# Patient Record
Sex: FEMALE | Race: WHITE
[De-identification: names, ages, dates, MRNs, and addresses within clinical notes are randomized per-mention and may not be internally consistent; named-entity substitution may affect disease eponyms.]

---

## 2018-04-24 ENCOUNTER — HOSPITAL ENCOUNTER (OUTPATIENT)
Dept: HOSPITAL 47 - RADUSWWP | Age: 83
Discharge: HOME | End: 2018-04-24
Payer: MEDICARE

## 2018-04-24 DIAGNOSIS — M79.661: ICD-10-CM

## 2018-04-24 DIAGNOSIS — M79.662: Primary | ICD-10-CM

## 2018-04-24 PROCEDURE — 93923 UPR/LXTR ART STDY 3+ LVLS: CPT

## 2019-10-15 ENCOUNTER — HOSPITAL ENCOUNTER (OUTPATIENT)
Dept: HOSPITAL 47 - LABWHC1 | Age: 84
Discharge: HOME | End: 2019-10-15
Attending: INTERNAL MEDICINE
Payer: MEDICARE

## 2019-10-15 DIAGNOSIS — E66.9: Primary | ICD-10-CM

## 2019-10-15 DIAGNOSIS — I12.9: ICD-10-CM

## 2019-10-15 DIAGNOSIS — N18.9: ICD-10-CM

## 2019-10-15 PROCEDURE — 36415 COLL VENOUS BLD VENIPUNCTURE: CPT

## 2019-10-15 PROCEDURE — 80051 ELECTROLYTE PANEL: CPT

## 2019-10-15 PROCEDURE — 82565 ASSAY OF CREATININE: CPT

## 2019-10-15 PROCEDURE — 84520 ASSAY OF UREA NITROGEN: CPT

## 2019-10-15 PROCEDURE — 83036 HEMOGLOBIN GLYCOSYLATED A1C: CPT

## 2019-10-16 LAB
ANION GAP SERPL CALC-SCNC: 13.2 MMOL/L (ref 4–12)
BUN SERPL-SCNC: 25 MG/DL (ref 9–27)
CHLORIDE SERPL-SCNC: 104 MMOL/L (ref 96–109)
CO2 SERPL-SCNC: 22.8 MMOL/L (ref 21.6–31.8)
HBA1C MFR BLD: 5.5 % (ref 4–6)
POTASSIUM SERPL-SCNC: 4.8 MMOL/L (ref 3.5–5.5)
SODIUM SERPL-SCNC: 140 MMOL/L (ref 135–145)

## 2020-04-28 ENCOUNTER — HOSPITAL ENCOUNTER (EMERGENCY)
Dept: HOSPITAL 47 - EC | Age: 85
Discharge: OTHER INSTITUTION | End: 2020-04-28
Payer: MEDICARE

## 2020-04-28 VITALS — RESPIRATION RATE: 16 BRPM | TEMPERATURE: 98.7 F

## 2020-04-28 VITALS — HEART RATE: 86 BPM | SYSTOLIC BLOOD PRESSURE: 132 MMHG | DIASTOLIC BLOOD PRESSURE: 79 MMHG

## 2020-04-28 DIAGNOSIS — R90.0: Primary | ICD-10-CM

## 2020-04-28 DIAGNOSIS — R53.1: ICD-10-CM

## 2020-04-28 DIAGNOSIS — R60.0: ICD-10-CM

## 2020-04-28 LAB
ANION GAP SERPL CALC-SCNC: 9 MMOL/L
BASOPHILS # BLD AUTO: 0 K/UL (ref 0–0.2)
BASOPHILS NFR BLD AUTO: 1 %
BUN SERPL-SCNC: 14 MG/DL (ref 7–17)
CALCIUM SPEC-MCNC: 9.8 MG/DL (ref 8.4–10.2)
CHLORIDE SERPL-SCNC: 97 MMOL/L (ref 98–107)
CO2 SERPL-SCNC: 27 MMOL/L (ref 22–30)
EOSINOPHIL # BLD AUTO: 0.1 K/UL (ref 0–0.7)
EOSINOPHIL NFR BLD AUTO: 1 %
ERYTHROCYTE [DISTWIDTH] IN BLOOD BY AUTOMATED COUNT: 5.92 M/UL (ref 3.8–5.4)
ERYTHROCYTE [DISTWIDTH] IN BLOOD: 13.4 % (ref 11.5–15.5)
GLUCOSE SERPL-MCNC: 102 MG/DL (ref 74–99)
HCT VFR BLD AUTO: 53.5 % (ref 34–46)
HGB BLD-MCNC: 17.1 GM/DL (ref 11.4–16)
LYMPHOCYTES # SPEC AUTO: 1 K/UL (ref 1–4.8)
LYMPHOCYTES NFR SPEC AUTO: 11 %
MAGNESIUM SPEC-SCNC: 2.1 MG/DL (ref 1.6–2.3)
MCH RBC QN AUTO: 28.8 PG (ref 25–35)
MCHC RBC AUTO-ENTMCNC: 31.9 G/DL (ref 31–37)
MCV RBC AUTO: 90.4 FL (ref 80–100)
MONOCYTES # BLD AUTO: 0.9 K/UL (ref 0–1)
MONOCYTES NFR BLD AUTO: 10 %
NEUTROPHILS # BLD AUTO: 6.9 K/UL (ref 1.3–7.7)
NEUTROPHILS NFR BLD AUTO: 76 %
PH UR: 5.5 [PH] (ref 5–8)
PLATELET # BLD AUTO: 222 K/UL (ref 150–450)
POTASSIUM SERPL-SCNC: 4.5 MMOL/L (ref 3.5–5.1)
SODIUM SERPL-SCNC: 133 MMOL/L (ref 137–145)
SP GR UR: 1.02 (ref 1–1.03)
UROBILINOGEN UR QL STRIP: 2 MG/DL (ref ?–2)
WBC # BLD AUTO: 9.1 K/UL (ref 3.8–10.6)

## 2020-04-28 PROCEDURE — 83735 ASSAY OF MAGNESIUM: CPT

## 2020-04-28 PROCEDURE — 96374 THER/PROPH/DIAG INJ IV PUSH: CPT

## 2020-04-28 PROCEDURE — 85025 COMPLETE CBC W/AUTO DIFF WBC: CPT

## 2020-04-28 PROCEDURE — 99285 EMERGENCY DEPT VISIT HI MDM: CPT

## 2020-04-28 PROCEDURE — 81003 URINALYSIS AUTO W/O SCOPE: CPT

## 2020-04-28 PROCEDURE — 36415 COLL VENOUS BLD VENIPUNCTURE: CPT

## 2020-04-28 PROCEDURE — 80048 BASIC METABOLIC PNL TOTAL CA: CPT

## 2020-04-28 PROCEDURE — 93005 ELECTROCARDIOGRAM TRACING: CPT

## 2020-04-28 PROCEDURE — 70450 CT HEAD/BRAIN W/O DYE: CPT

## 2020-04-28 NOTE — CT
EXAMINATION TYPE: CT brain wo con

 

DATE OF EXAM: 4/28/2020

 

COMPARISON: None

 

INDICATION: ams

 

DLP: 1158.4 mGycm, Automated exposure control for dose reduction was used.

 

CONTRAST: None

 

CT of the brain is performed utilizing 3 mm thick sections through the posterior fossa and 3 mm thick
 sections through the remaining calvarium.  Study is performed within 24 hours of arrival to the hosp
ital. 

 

No abnormal hyperdensity is present to suggest an acute intracranial hemorrhage.

There is a approximately 3.8 cm masslike area within the right temporal lobe. Significant vasogenic e
galdino is present extending into the trilobar region. There is midline shift of 0.78 cm. There is compr
ession of the lateral ventricles. Third ventricle is patent and shifted to the left. Fourth ventricle
 is midline and patent. Quadrigeminal plate and ambient cisterns appear normal

No acute infarcts are evident. No hydrocephalus is present.

 

Paranasal sinuses and mastoid air cells within the field-of-view are clear.

 

IMPRESSIONS:

1.   Masslike area estimated to measure 3.8 cm in the proximal right temporal lobe with significant v
asogenic edema extending into the trilobar region and basal ganglion contributing to midline shift of
 0.8 cm towards the left. Report was called to emergency room physician by Dr. Kirk by telephone 1
111 hours 4/28/2020.

## 2020-04-28 NOTE — ED
General Adult HPI





- General


Chief complaint: Recheck/Abnormal Lab/Rx


Stated complaint: Stroke Symptoms, Edema in feet


Time Seen by Provider: 04/28/20 15:32


Source: EMS


Mode of arrival: EMS


Limitations: physical limitation





- History of Present Illness


Initial comments: 


Dictation was produced using dragon dictation software. please excuse any 

grammatical, word or spelling errors. 





This patient was cared for during a federal and state declared state of 

emergency secondary to Covid 19





Chief Complaint: 86-year-old female past medical history of hypertension, reflux

presents with slowed speech and lethargy





History of Present Illness: 86-year-old female she has no significant 

comorbidities.  She is brought in by EMS.  EMS was called because HER-2 sons 

were concerned she is having a stroke.  She woke this morning and appeared to be

more lethargic than usual.  Patient does not have a history of lethargy.  

Patient requires significant assistance with performance of daily living.  They 

noted that her slowed speech was apparent upon waking this morning.  She was 

last normal yesterday.  She does not have any history of strokes.  Denies any 

numbness saline paresthesias.  Sons note that patient is not confused or 

slurring her speech.








The ROS documented in this emergency department record has been reviewed and 

confirmed by me.  Those systems with pertinent positive or negative responses 

have been documented in the HPI.  All other systems are other negative and/or 

noncontributory.








PHYSICAL EXAM:


General Impression: Alert and oriented x4/4, not in acute distress


HEENT: Normocephalic atraumatic, extra-ocular movements intact, pupils equal and

reactive to light bilaterally, mucous membranes moist.


Cardiovascular: Heart regular rate and rhythm


Chest: Able to complete full sentences, no retractions, no tachypnea


Abdomen: abdomen soft, non-tender, non-distended, no organomegaly


Musculoskeletal: Pulses present and equal in all extremities, no peripheral 

edema


Motor:  no focal deficits noted


Neurological: CN II-XII grossly intact, no focal motor or sensory deficits 

noted, NIH 0


Skin: Intact with no visualized rashes


Psych: Normal affect and mood





ED course: 86-year-old female presents with generalized weakness upon waking 

this morning.  Signs upon arrival are within acceptable limits.  Patient has no 

signs or symptoms to suggest CVA.  NIH is 0.


Laboratory evaluation obtained.  Hemoglobin was 17.1 concerning for hem

oconcentration.  Metabolic panel is unremarkable.  Computed tomography scan of 

the brain was obtained showing masslike area measuring 3.8 cm and there are 

proximal right temporal lobe with significant vasogenic edema.  There is also 

0.8 cm midline shift towards the left.  Patient reevaluated at bedside.  She 

appears to be in stable medical condition.  She denies any headache.  Patient 

given 10 mg of IV Decadron.  CT imaging results were discussed with patient and 

family member.  We'll plan to have patient transferred to [facility].  

Patient and family are agreeable.  Discussed patient case with Dr. Buenrostro at 

[facility].  He reports he will contact neurosurgery noted to confirm that 

they will accept patient for ER to ER transfer.  Discussed patient case with Dr. Buenrostro who is willing to accept the ER transfer.





EKG interpretation: Ventricular rate 67, sinus rhythm, PA interval 166, QRS 90, 

. No PA prolongation, no QTC prolongation, no ST or T-wave changes noted.

 No old EKG for comparison.  Overall, this EKG is unremarkable











Review of Systems


ROS Statement: 


Those systems with pertinent positive or pertinent negative responses have been 

documented in the HPI.





ROS Other: All systems not noted in ROS Statement are negative.





Past Medical History


Past Medical History: No Reported History


History of Any Multi-Drug Resistant Organisms: None Reported


Past Surgical History: No Surgical Hx Reported


Past Psychological History: No Psychological Hx Reported


Smoking Status: Never smoker


Past Alcohol Use History: None Reported


Past Drug Use History: None Reported





General Exam


Limitations: physical limitation





Course


                                   Vital Signs











  04/28/20 04/28/20





  15:33 16:56


 


Temperature 98.7 F 


 


Pulse Rate 65 81


 


Respiratory 16 16





Rate  


 


Blood Pressure 137/98 132/76


 


O2 Sat by Pulse 96 98





Oximetry  














Medical Decision Making





- Lab Data


Result diagrams: 


                                 04/28/20 16:00





                                 04/28/20 16:00


                                   Lab Results











  04/28/20 04/28/20 Range/Units





  16:00 16:00 


 


WBC  9.1   (3.8-10.6)  k/uL


 


RBC  5.92 H   (3.80-5.40)  m/uL


 


Hgb  17.1 H   (11.4-16.0)  gm/dL


 


Hct  53.5 H   (34.0-46.0)  %


 


MCV  90.4   (80.0-100.0)  fL


 


MCH  28.8   (25.0-35.0)  pg


 


MCHC  31.9   (31.0-37.0)  g/dL


 


RDW  13.4   (11.5-15.5)  %


 


Plt Count  222   (150-450)  k/uL


 


Neutrophils %  76   %


 


Lymphocytes %  11   %


 


Monocytes %  10   %


 


Eosinophils %  1   %


 


Basophils %  1   %


 


Neutrophils #  6.9   (1.3-7.7)  k/uL


 


Lymphocytes #  1.0   (1.0-4.8)  k/uL


 


Monocytes #  0.9   (0-1.0)  k/uL


 


Eosinophils #  0.1   (0-0.7)  k/uL


 


Basophils #  0.0   (0-0.2)  k/uL


 


Sodium   133 L  (137-145)  mmol/L


 


Potassium   4.5  (3.5-5.1)  mmol/L


 


Chloride   97 L  ()  mmol/L


 


Carbon Dioxide   27  (22-30)  mmol/L


 


Anion Gap   9  mmol/L


 


BUN   14  (7-17)  mg/dL


 


Creatinine   0.92  (0.52-1.04)  mg/dL


 


Est GFR (CKD-EPI)AfAm   65  (>60 ml/min/1.73 sqM)  


 


Est GFR (CKD-EPI)NonAf   57  (>60 ml/min/1.73 sqM)  


 


Glucose   102 H  (74-99)  mg/dL


 


Calcium   9.8  (8.4-10.2)  mg/dL


 


Magnesium   2.1  (1.6-2.3)  mg/dL














Disposition


Clinical Impression: 


 Intracranial mass





Disposition: OTHER INSTITUTION NOT DEFINED


Condition: Critical


Referrals: 


Mindy Tello MD [Primary Care Provider] - 1-2 days


Time of Disposition: 17:34





- Out of Hospital Transfer - Req. Specs


Out of Hospital Transfer - Requested Specifics: Other Emergency Center ([facility])

## 2020-05-15 ENCOUNTER — HOSPITAL ENCOUNTER (EMERGENCY)
Dept: HOSPITAL 47 - EC | Age: 85
LOS: 1 days | Discharge: OTHER INSTITUTION | End: 2020-05-16
Payer: MEDICARE

## 2020-05-15 DIAGNOSIS — J18.9: ICD-10-CM

## 2020-05-15 DIAGNOSIS — Z85.841: ICD-10-CM

## 2020-05-15 DIAGNOSIS — A41.9: ICD-10-CM
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
## 2020-05-15 DIAGNOSIS — T81.44XA: ICD-10-CM

## 2020-05-15 DIAGNOSIS — D72.829: ICD-10-CM

## 2020-05-15 DIAGNOSIS — K52.9: Primary | ICD-10-CM

## 2020-05-15 PROCEDURE — 86901 BLOOD TYPING SEROLOGIC RH(D): CPT

## 2020-05-15 PROCEDURE — 36556 INSERT NON-TUNNEL CV CATH: CPT

## 2020-05-15 PROCEDURE — 99291 CRITICAL CARE FIRST HOUR: CPT

## 2020-05-15 PROCEDURE — 87045 FECES CULTURE AEROBIC BACT: CPT

## 2020-05-15 PROCEDURE — 84484 ASSAY OF TROPONIN QUANT: CPT

## 2020-05-15 PROCEDURE — 85610 PROTHROMBIN TIME: CPT

## 2020-05-15 PROCEDURE — 86900 BLOOD TYPING SEROLOGIC ABO: CPT

## 2020-05-15 PROCEDURE — 96368 THER/DIAG CONCURRENT INF: CPT

## 2020-05-15 PROCEDURE — 80053 COMPREHEN METABOLIC PANEL: CPT

## 2020-05-15 PROCEDURE — 84100 ASSAY OF PHOSPHORUS: CPT

## 2020-05-15 PROCEDURE — 87046 STOOL CULTR AEROBIC BACT EA: CPT

## 2020-05-15 PROCEDURE — 93005 ELECTROCARDIOGRAM TRACING: CPT

## 2020-05-15 PROCEDURE — 36415 COLL VENOUS BLD VENIPUNCTURE: CPT

## 2020-05-15 PROCEDURE — 70450 CT HEAD/BRAIN W/O DYE: CPT

## 2020-05-15 PROCEDURE — 96372 THER/PROPH/DIAG INJ SC/IM: CPT

## 2020-05-15 PROCEDURE — 86850 RBC ANTIBODY SCREEN: CPT

## 2020-05-15 PROCEDURE — 85730 THROMBOPLASTIN TIME PARTIAL: CPT

## 2020-05-15 PROCEDURE — 74022 RADEX COMPL AQT ABD SERIES: CPT

## 2020-05-15 PROCEDURE — 96365 THER/PROPH/DIAG IV INF INIT: CPT

## 2020-05-15 PROCEDURE — 85025 COMPLETE CBC W/AUTO DIFF WBC: CPT

## 2020-05-15 PROCEDURE — 87324 CLOSTRIDIUM AG IA: CPT

## 2020-05-15 PROCEDURE — 87040 BLOOD CULTURE FOR BACTERIA: CPT

## 2020-05-15 PROCEDURE — 83735 ASSAY OF MAGNESIUM: CPT

## 2020-05-15 PROCEDURE — 96375 TX/PRO/DX INJ NEW DRUG ADDON: CPT

## 2020-05-15 PROCEDURE — 83605 ASSAY OF LACTIC ACID: CPT

## 2020-05-16 VITALS
SYSTOLIC BLOOD PRESSURE: 108 MMHG | TEMPERATURE: 97.7 F | DIASTOLIC BLOOD PRESSURE: 52 MMHG | RESPIRATION RATE: 15 BRPM | HEART RATE: 72 BPM

## 2020-05-16 LAB
ALBUMIN SERPL-MCNC: 2.9 G/DL (ref 3.5–5)
ALP SERPL-CCNC: 94 U/L (ref 38–126)
ALT SERPL-CCNC: 15 U/L (ref 4–34)
ANION GAP SERPL CALC-SCNC: 12 MMOL/L
APTT BLD: 27.7 SEC (ref 22–30)
AST SERPL-CCNC: 22 U/L (ref 14–36)
BASOPHILS # BLD AUTO: 0 K/UL (ref 0–0.2)
BASOPHILS NFR BLD AUTO: 0 %
BUN SERPL-SCNC: 71 MG/DL (ref 7–17)
CALCIUM SPEC-MCNC: 9.8 MG/DL (ref 8.4–10.2)
CHLORIDE SERPL-SCNC: 96 MMOL/L (ref 98–107)
CO2 SERPL-SCNC: 23 MMOL/L (ref 22–30)
EOSINOPHIL # BLD AUTO: 0.1 K/UL (ref 0–0.7)
EOSINOPHIL NFR BLD AUTO: 0 %
ERYTHROCYTE [DISTWIDTH] IN BLOOD BY AUTOMATED COUNT: 5.52 M/UL (ref 3.8–5.4)
ERYTHROCYTE [DISTWIDTH] IN BLOOD: 13.7 % (ref 11.5–15.5)
GLUCOSE SERPL-MCNC: 184 MG/DL (ref 74–99)
HCT VFR BLD AUTO: 49.7 % (ref 34–46)
HGB BLD-MCNC: 16.3 GM/DL (ref 11.4–16)
INR PPP: 1 (ref ?–1.2)
LYMPHOCYTES # SPEC AUTO: 0.2 K/UL (ref 1–4.8)
LYMPHOCYTES NFR SPEC AUTO: 1 %
MAGNESIUM SPEC-SCNC: 2.6 MG/DL (ref 1.6–2.3)
MCH RBC QN AUTO: 29.5 PG (ref 25–35)
MCHC RBC AUTO-ENTMCNC: 32.7 G/DL (ref 31–37)
MCV RBC AUTO: 90.1 FL (ref 80–100)
MONOCYTES # BLD AUTO: 1.1 K/UL (ref 0–1)
MONOCYTES NFR BLD AUTO: 3 %
NEUTROPHILS # BLD AUTO: 34.6 K/UL (ref 1.3–7.7)
NEUTROPHILS NFR BLD AUTO: 96 %
PLATELET # BLD AUTO: 182 K/UL (ref 150–450)
POTASSIUM SERPL-SCNC: 4.4 MMOL/L (ref 3.5–5.1)
PROT SERPL-MCNC: 5.3 G/DL (ref 6.3–8.2)
PT BLD: 10.4 SEC (ref 9–12)
SODIUM SERPL-SCNC: 131 MMOL/L (ref 137–145)
WBC # BLD AUTO: 36 K/UL (ref 3.8–10.6)

## 2020-05-16 RX ADMIN — NICARDIPINE HYDROCHLORIDE SCH MLS/HR: 2.5 INJECTION INTRAVENOUS at 02:30

## 2020-05-16 NOTE — ED
GI Bleed HPI





- General


Chief complaint: GI Bleed


Stated complaint: GI Bleed


Time Seen by Provider: 05/15/20 23:34


Source: patient, EMS, RN notes reviewed, old records reviewed


Mode of arrival: EMS


Limitations: altered mental status, physical limitation





- History of Present Illness


Initial comments: 





This is an 86-year-old female who is a poor historian history obtained by EMS 

patient is a complex recent medical history patient was postop intracranial s

urgery for tumor.  Patient was at [facility] yesterday surgery was Tuesday.  

Patient's been having diarrhea nausea vomiting and feeling weak.  No reported 

fevers patient here in the ER just complains of some generalized abdominal pain 

with mild confusion


MD complaint: other (Patient has overall confusion)


-: days(s)


Radiation: none


Severity scale (1-10): 4 (Weakness)


Quality: painless


Consistency: constant


Improves with: none


Worsens with: vomiting


Associated Symptoms: nausea, loss of appetite, shortness of breath, weakness





- Related Data


                                    Allergies











Allergy/AdvReac Type Severity Reaction Status Date / Time


 


No Known Allergies Allergy   Verified 05/15/20 23:38














Review of Systems


ROS Statement: 


Those systems with pertinent positive or pertinent negative responses have been 

documented in the HPI.





ROS Other: All systems not noted in ROS Statement are negative.





Past Medical History


Past Medical History: No Reported History, COPD, GERD/Reflux


Additional Past Medical History / Comment(s): brain tumor


History of Any Multi-Drug Resistant Organisms: None Reported


Past Surgical History: No Surgical Hx Reported


Additional Past Surgical History / Comment(s): crainotomy


Past Psychological History: No Psychological Hx Reported


Smoking Status: Never smoker


Past Alcohol Use History: None Reported


Past Drug Use History: None Reported





General Exam


Limitations: altered mental status, physical limitation


General appearance: alert, lethargic, obese


Head exam: Present: normocephalic, normal inspection.  Absent: atraumatic (well 

healing insicion CDI)


Eye exam: Present: normal appearance, PERRL, EOMI.  Absent: scleral icterus, 

conjunctival injection, periorbital swelling


ENT exam: Present: normal exam, mucous membranes dry


Neck exam: Present: normal inspection.  Absent: tenderness, meningismus, 

lymphadenopathy


Respiratory exam: Present: decreased breath sounds, prolonged expiratory.  

Absent: respiratory distress, wheezes, rales, rhonchi, stridor


Cardiovascular Exam: Present: regular rate, normal rhythm, irregular rhythm, 

normal heart sounds.  Absent: systolic murmur, diastolic murmur, rubs, gallop, 

clicks


GI/Abdominal exam: Present: soft, normal bowel sounds.  Absent: distended, 

tenderness, guarding, rebound, rigid


Extremities exam: Present: normal inspection, full ROM, normal capillary refill.

 Absent: tenderness, pedal edema, joint swelling, calf tenderness


Back exam: Present: normal inspection


Neurological exam: Present: alert, oriented X3, CN II-XII intact


Psychiatric exam: Present: normal affect, normal mood


Skin exam: Present: warm, dry, intact, normal color.  Absent: rash





Course


                                   Vital Signs











  05/15/20 05/15/20 05/16/20





  23:33 23:50 00:05


 


Temperature 97.4 F L  97.7 F


 


Pulse Rate 62 72 68


 


Respiratory 16 16 15





Rate   


 


Blood Pressure 113/60 129/77 121/57


 


O2 Sat by Pulse 95 96 97





Oximetry   














  05/16/20 05/16/20 05/16/20





  00:35 01:00 01:30


 


Temperature   


 


Pulse Rate 68 73 63


 


Respiratory 14 16 14





Rate   


 


Blood Pressure 115/62 141/49 131/89


 


O2 Sat by Pulse 91 L 96 95





Oximetry   














  05/16/20





  02:00


 


Temperature 97.8 F


 


Pulse Rate 84


 


Respiratory 15





Rate 


 


Blood Pressure 126/86


 


O2 Sat by Pulse 96





Oximetry 














- Reevaluation(s)


Reevaluation #1: 





05/16/20 02:39


Medical records reviewed including lab tests from yesterday


Reevaluation #2: 





05/16/20 02:40


Patient no significant rust for a stress vital signs remaining normal and stable

in the ER





- Consultations


Consultation #1: 





Spoke with Dr. Quiros will prefer transfer to operating hospital


Consultation #2: 





Spoke with [facility] Fry were acceptable for transfer





Procedures





- Central Line Placement


  ** Right IJ


Consent Obtained: verbal consent


Patient Placed on Monitor/Pulse Ox: Yes


MD Prep: mask, gown, gloves


Central Line Prep: Povidone-Iodine 1%


Local Anesthesia Used: Lidocaine 1%


Ultrasound Used for Placement: Yes


Central Line Lumen Inserted: triple


Bloods Obtained for Lab: Yes


Central Line Position: good blood return, all ports aspirated, flushed, capped, 

sutured in place with 3-0 nylon


Dressing Applied: Tegaderm


Post Procedure X-Ray: tip of catheter in good position


Patient Tolerated Procedure: well


Complications: none





Medical Decision Making





- Medical Decision Making





86 female DF for evaluation patient presents with weakness patient is 5 days 

postoperative cranial tumor.  Craniotomy.  Patient has pneumonia with 

significantly elevated white count.  Patient will be admitted for postop 

infection pneumonia and sepsis





- Lab Data


Result diagrams: 


                                 05/16/20 00:55





                                 05/16/20 00:55


                                   Lab Results











  05/16/20 05/16/20 05/16/20 Range/Units





  00:55 00:55 00:55 


 


WBC  36.0 H    (3.8-10.6)  k/uL


 


RBC  5.52 H    (3.80-5.40)  m/uL


 


Hgb  16.3 H    (11.4-16.0)  gm/dL


 


Hct  49.7 H    (34.0-46.0)  %


 


MCV  90.1    (80.0-100.0)  fL


 


MCH  29.5    (25.0-35.0)  pg


 


MCHC  32.7    (31.0-37.0)  g/dL


 


RDW  13.7    (11.5-15.5)  %


 


Plt Count  182    (150-450)  k/uL


 


Neutrophils %  96    %


 


Lymphocytes %  1    %


 


Monocytes %  3    %


 


Eosinophils %  0    %


 


Basophils %  0    %


 


Neutrophils #  34.6 H    (1.3-7.7)  k/uL


 


Lymphocytes #  0.2 L    (1.0-4.8)  k/uL


 


Monocytes #  1.1 H    (0-1.0)  k/uL


 


Eosinophils #  0.1    (0-0.7)  k/uL


 


Basophils #  0.0    (0-0.2)  k/uL


 


PT   10.4   (9.0-12.0)  sec


 


INR   1.0   (<1.2)  


 


APTT   27.7   (22.0-30.0)  sec


 


Sodium    131 L  (137-145)  mmol/L


 


Potassium    4.4  (3.5-5.1)  mmol/L


 


Chloride    96 L  ()  mmol/L


 


Carbon Dioxide    23  (22-30)  mmol/L


 


Anion Gap    12  mmol/L


 


BUN    71 H  (7-17)  mg/dL


 


Creatinine    1.55 H  (0.52-1.04)  mg/dL


 


Est GFR (CKD-EPI)AfAm    35  (>60 ml/min/1.73 sqM)  


 


Est GFR (CKD-EPI)NonAf    30  (>60 ml/min/1.73 sqM)  


 


Glucose    184 H  (74-99)  mg/dL


 


Plasma Lactic Acid Kota     (0.7-2.0)  mmol/L


 


Calcium    9.8  (8.4-10.2)  mg/dL


 


Phosphorus    4.7 H  (2.5-4.5)  mg/dL


 


Magnesium    2.6 H  (1.6-2.3)  mg/dL


 


Total Bilirubin    1.1  (0.2-1.3)  mg/dL


 


AST    22  (14-36)  U/L


 


ALT    15  (4-34)  U/L


 


Alkaline Phosphatase    94  ()  U/L


 


Troponin I     (0.000-0.034)  ng/mL


 


Total Protein    5.3 L  (6.3-8.2)  g/dL


 


Albumin    2.9 L  (3.5-5.0)  g/dL


 


Blood Type     


 


Blood Type Recheck     


 


Bld Type Recheck Status     


 


Antibody Screen     


 


Spec Expiration Date     














  05/16/20 05/16/20 05/16/20 Range/Units





  00:55 00:55 00:55 


 


WBC     (3.8-10.6)  k/uL


 


RBC     (3.80-5.40)  m/uL


 


Hgb     (11.4-16.0)  gm/dL


 


Hct     (34.0-46.0)  %


 


MCV     (80.0-100.0)  fL


 


MCH     (25.0-35.0)  pg


 


MCHC     (31.0-37.0)  g/dL


 


RDW     (11.5-15.5)  %


 


Plt Count     (150-450)  k/uL


 


Neutrophils %     %


 


Lymphocytes %     %


 


Monocytes %     %


 


Eosinophils %     %


 


Basophils %     %


 


Neutrophils #     (1.3-7.7)  k/uL


 


Lymphocytes #     (1.0-4.8)  k/uL


 


Monocytes #     (0-1.0)  k/uL


 


Eosinophils #     (0-0.7)  k/uL


 


Basophils #     (0-0.2)  k/uL


 


PT     (9.0-12.0)  sec


 


INR     (<1.2)  


 


APTT     (22.0-30.0)  sec


 


Sodium     (137-145)  mmol/L


 


Potassium     (3.5-5.1)  mmol/L


 


Chloride     ()  mmol/L


 


Carbon Dioxide     (22-30)  mmol/L


 


Anion Gap     mmol/L


 


BUN     (7-17)  mg/dL


 


Creatinine     (0.52-1.04)  mg/dL


 


Est GFR (CKD-EPI)AfAm     (>60 ml/min/1.73 sqM)  


 


Est GFR (CKD-EPI)NonAf     (>60 ml/min/1.73 sqM)  


 


Glucose     (74-99)  mg/dL


 


Plasma Lactic Acid Kota  1.4    (0.7-2.0)  mmol/L


 


Calcium     (8.4-10.2)  mg/dL


 


Phosphorus     (2.5-4.5)  mg/dL


 


Magnesium     (1.6-2.3)  mg/dL


 


Total Bilirubin     (0.2-1.3)  mg/dL


 


AST     (14-36)  U/L


 


ALT     (4-34)  U/L


 


Alkaline Phosphatase     ()  U/L


 


Troponin I   <0.012   (0.000-0.034)  ng/mL


 


Total Protein     (6.3-8.2)  g/dL


 


Albumin     (3.5-5.0)  g/dL


 


Blood Type    O Positive  


 


Blood Type Recheck    No Previous Record  


 


Bld Type Recheck Status    CABO Indicated  


 


Antibody Screen    NEGATIVE  


 


Spec Expiration Date    05/19/2020 - 6625  














- EKG Data


-: EKG Interpreted by Me (KEG shows NSR 79   QTc 405)





- Radiology Data


Radiology results: report reviewed (CT brain is consistent with stop surgery 

changes, chest x-ray and abdominal series x-ray does show pneumonia), image 

reviewed





Critical Care Time


Critical Care Time: Yes


Total Critical Care Time: 31





Disposition


Clinical Impression: 


 Colitis, Diarrhea, Leukocytosis, Nosocomial pneumonia, Postoperative sepsis





Narrative: 





postOp Brain Tumor


Disposition: OTHER INSTITUTION NOT DEFINED


Condition: Serious


Is patient prescribed a controlled substance at d/c from ED?: No


Referrals: 


Dl Oswald DO [Primary Care Provider] - 1-2 days





- Out of Hospital Transfer - Req. Specs


Out of Hospital Transfer - Requested Specifics: Other Emergency Center ([facility]

## 2020-05-16 NOTE — XR
EXAMINATION TYPE: XR abdomen acute w cxr

 

DATE OF EXAM: 5/16/2020

 

COMPARISON: NONE

 

HISTORY: Abdominal pain

 

TECHNIQUE: 4 views

 

FINDINGS: Heart is enlarged. There is left shoulder prosthesis. There is right central venous cathete
r with tip in the right atrium. There is no heart failure. There is blunting left costophrenic angle.


 

There is no sign of intestinal obstruction or pneumoperitoneum. Fecal pattern is normal. There are malik
rgical clips in the right side of the abdomen. There are no pathologic calcifications over the kidney
s.

 

IMPRESSION: Nonacute abdomen.

 

Cardiomegaly with left pleural effusion. No heart failure seen. Left lower lobe pneumonia is possible
.

## 2020-05-16 NOTE — CT
EXAMINATION TYPE: CT brain wo con

 

DATE OF EXAM: 5/16/2020

 

COMPARISON: 4/28/2020

 

HISTORY: AMS

 

CT DLP: 1102.4 mGycm

Automated exposure control for dose reduction was used.

 

There is right temporal craniotomy defect. There is postsurgical changes with hypodensity in the righ
t temporal lobe and air bubbles. There is no midline shift. There is no sign of intracranial hemorrha
ge. There is no hydrocephalus.

 

IMPRESSION:

Postsurgical changes with pneumocephalus and large 6 x 4 cm area of edema in the right temporal lobe.
 There is decreased mass effect upon the right lateral ventricle compared to last exam before the scott
mallory. There is no significant change of the white matter edema around the mass compared to preoperati
ve exam.